# Patient Record
Sex: FEMALE | Race: ASIAN | NOT HISPANIC OR LATINO | URBAN - METROPOLITAN AREA
[De-identification: names, ages, dates, MRNs, and addresses within clinical notes are randomized per-mention and may not be internally consistent; named-entity substitution may affect disease eponyms.]

---

## 2022-12-21 NOTE — PROGRESS NOTES
"Female Preventive Health Visit    Social History     Social History Narrative    Moved from [facility] in 2018. Lives with cousin. Works at [employer] as . Enjoys shopping and watching [place] drama.      Entirely of appointment conducted with the assistance of a professional interpretor.     SUBJECTIVE:      This 27 year old female presents to establish care for a routine preventive physical exam. Reports no routine health care in some time.     The patient has the following concerns:     1.  Retained condom 1 month ago. Has had period since. Happy with condoms for now.     2.  S/p multiple skin graft surgeries to right lateral ankle occurring in [facility]. No outside records at the time of writing. From patient description sounds like prior large hemangioma in that area was surgically removed, requiring skin grafting.     Patient's medications, allergies, past medical, surgical and family histories were reviewed and updated as appropriate.    OB/Gyn History:      Last Pap Smear: none prior. Due first today.   Current Contraceptive Method:  condom. Is currently sexually active. Happy with this form of contraception.     Health Maintenance:  Health maintenance alerts were reviewed and updated as appropriate.    Healthy Habits:    Do you get at least three servings of calcium containing foods daily (dairy, green leafy vegetables, etc.)? yes    Amount of exercise or daily activities, outside of work: none noted    Problems taking medications regularly No    Medication side effects: No    Have you had an eye exam in the past two years? no    Do you see a dentist twice per year? no    Do you have sleep apnea, excessive snoring or daytime drowsiness?no      OBJECTIVE:  Vitals:    12/28/22 0913   BP: 92/68   Pulse: 62   Resp: 16   Temp: 97.5  F (36.4  C)   SpO2: 99%   Weight: 62.5 kg (137 lb 12.8 oz)   Height: 1.626 m (5' 4\")    Body mass index is 23.65 kg/m .    General: no acute distress, cooperative with " exam.  HEENT:  PERRLA. Bilateral TM's, external canals, oropharynx normal.    Neck:  Supple, no lymphadenopathy or thyromegaly   Breasts: breasts appear normal, no suspicious masses, no skin or nipple changes  Lungs: clear to auscultation bilaterally, normal respiratory effort.  Heart:  RRR without murmurs, rubs or gallops.  Normal S1 and S2.  Abdomen: normal bowel sounds, nontender, no palpable organomegaly.  Genitourinary: normal external genitalia, vulva, vagina. No palpable abnormalities or tenderness of the cervix, uterus and adnexa.   Skin:  Approximately 8 cm by 5 cm patch of well healed skin grafting area to right lateral shin, just superior to lateral malleolus. Overlying dry scale.   Extremities: warm, perfused, no swelling or edema.  Neuro:  CN II-XII intact, motor & sensory function all intact.    Psych: mental status normal, mood and affect appropriate.    ASSESSMENT / PLAN:  This 27 year old female presents for a routine preventive physical exam. Preventive health topics discussed including adequate exercise and healthy diet. Return to clinic in one year for preventative exam or sooner with any other concerns.  Other issues discussed as noted below.      Routine general medical examination at a health care facility    Encounter for screening for HIV  -     HIV 1/0/2 Rflx ([facility])    Need for hepatitis C screening test  -     VENOUS COLLECTION  -     HCV Antibody ([facility])    Need for HPV vaccination  Discussed and issued on AVS:  You received the HPV vaccine today: come back for the   -second dose in 1-2 months from today  -third dose in 6 months today   -     HPV, IM (9 - 26 YRS) - Gardasil 9    Cervical cancer screening  -     Pap IG HPV rfx HPV 16 and 18,45 ([facility])    Skin lesion  Status post split thickness skin graft  Encouraged to use daily moisture (Eucerin or CeraVe) on area. Healthy looking otherwise. Patient has further questions about care of this area and would like dermatology referral.    -     Adult Dermatology Referral; Future  -     Adult Dermatology Referral; Future    Encounter for surveillance of other contraceptive  Fully discussed high failure rate of condoms as sole contraception and discussed she is at risk of pregnancy. Voices understanding. Discussed alternative forms of contraception inclusive of combined OCP and LARCS. Declines for now. Will continue this conversation at future appointments.

## 2022-12-21 NOTE — PATIENT INSTRUCTIONS
Preventive Health Recommendations  Female Ages 21 to 25     Yearly exam:   See your health care provider every year in order to  Review health changes.   Discuss preventive care.    Review your medicines if your doctor has prescribed any.    You should be tested each year for STDs (sexually transmitted diseases).     Talk to your provider about how often you should have cholesterol testing.            Get a Pap test every three years. If you have an abnormal result, your doctor may have you test more often.    If you are at risk for diabetes, you should have a diabetes test (fasting glucose).     Shots:   Get a flu shot each year.   Get a tetanus shot every 10 years.   Consider getting the shot (vaccine) that prevents cervical cancer (Gardasil).    Nutrition:   Eat at least 5 servings of fruits and vegetables each day.  Eat whole-grain bread, whole-wheat pasta and brown rice instead of white grains and rice.  Get adequate Calcium and Vitamin D.     Lifestyle  Exercise at least 150 minutes a week each week (30 minutes a day, 5 days a week). This will help you control your weight and prevent disease.  Limit alcohol to one drink per day.  No smoking.   Wear sunscreen to prevent skin cancer.  See your dentist every six months for an exam and cleaning.    You received the HPV vaccine today: come back for the   -second dose in 1-2 months from today  -third dose in 6 months today       Over the counter lotions : best options    Eucerin or CeraVe ( heavy duty  lotions)   -CeraVe contains ceramides (natural fats in skin)   -good for everyday moisturizing    Cetaphil (less heavy)   -very low incidence of reactions   -use in sensitive skin

## 2022-12-28 ENCOUNTER — OFFICE VISIT (OUTPATIENT)
Dept: FAMILY MEDICINE | Facility: CLINIC | Age: 27
End: 2022-12-28

## 2022-12-28 VITALS
WEIGHT: 137.8 LBS | HEIGHT: 64 IN | SYSTOLIC BLOOD PRESSURE: 92 MMHG | HEART RATE: 62 BPM | RESPIRATION RATE: 16 BRPM | OXYGEN SATURATION: 99 % | BODY MASS INDEX: 23.52 KG/M2 | TEMPERATURE: 97.5 F | DIASTOLIC BLOOD PRESSURE: 68 MMHG

## 2022-12-28 DIAGNOSIS — Z30.49 ENCOUNTER FOR SURVEILLANCE OF OTHER CONTRACEPTIVE: ICD-10-CM

## 2022-12-28 DIAGNOSIS — Z94.5 STATUS POST SPLIT THICKNESS SKIN GRAFT: ICD-10-CM

## 2022-12-28 DIAGNOSIS — L98.9 SKIN LESION: ICD-10-CM

## 2022-12-28 DIAGNOSIS — Z23 NEED FOR HPV VACCINATION: ICD-10-CM

## 2022-12-28 DIAGNOSIS — Z11.59 NEED FOR HEPATITIS C SCREENING TEST: ICD-10-CM

## 2022-12-28 DIAGNOSIS — Z11.4 ENCOUNTER FOR SCREENING FOR HIV: ICD-10-CM

## 2022-12-28 DIAGNOSIS — Z12.4 CERVICAL CANCER SCREENING: ICD-10-CM

## 2022-12-28 DIAGNOSIS — Z23 NEED FOR IMMUNIZATION AGAINST TETANUS ALONE: ICD-10-CM

## 2022-12-28 DIAGNOSIS — Z00.00 ROUTINE GENERAL MEDICAL EXAMINATION AT A HEALTH CARE FACILITY: Primary | ICD-10-CM

## 2022-12-28 PROCEDURE — 99213 OFFICE O/P EST LOW 20 MIN: CPT | Mod: 25 | Performed by: FAMILY MEDICINE

## 2022-12-28 PROCEDURE — 90715 TDAP VACCINE 7 YRS/> IM: CPT | Performed by: FAMILY MEDICINE

## 2022-12-28 PROCEDURE — 90651 9VHPV VACCINE 2/3 DOSE IM: CPT | Performed by: FAMILY MEDICINE

## 2022-12-28 PROCEDURE — 90471 IMMUNIZATION ADMIN: CPT | Mod: 59 | Performed by: FAMILY MEDICINE

## 2022-12-28 PROCEDURE — 90472 IMMUNIZATION ADMIN EACH ADD: CPT | Mod: 59 | Performed by: FAMILY MEDICINE

## 2022-12-28 PROCEDURE — 99395 PREV VISIT EST AGE 18-39: CPT | Performed by: FAMILY MEDICINE

## 2022-12-28 PROCEDURE — 36415 COLL VENOUS BLD VENIPUNCTURE: CPT | Performed by: FAMILY MEDICINE

## 2022-12-28 NOTE — LETTER
[facility] [address] [place]  [address]  Phone: [phone]    January 5, 2023      Keyshawn Caldwell  [address] [place] [address]            Dear Keyshawn,     It was maria eugenia to see you at your recent appointment.     You PAP smear is negative for abnormal cells and POSITIVE for HPV (a virus associated with being sexually active.) You have no changes in your cells and no evidence of cervical cancer, but as the presents of this virus can increase the risk of cervical cancer in the future, we should recheck the PAP smear in 1 year.     Your next PAP smear will be in 1 year.       Please let us know if you have any questions.     Sincerely,     Dr. Viviane Pedro         Results for orders placed or performed in visit on 12/28/22   HCV Antibody ([facility])     Status: None   Result Value Ref Range    Hep C Virus Ab <0.1 0.0 - 0.9 s/co ratio    Narrative    Performed at:  01 - [facility] [address], [place], [place]  [id number]  : Joseph Trimble MD, Phone:  [phone]   HIV 1/0/2 Rflx ([facility])     Status: None   Result Value Ref Range    HIV Screen 4th Gen with Rflx Non Reactive Non Reactive    Narrative    Performed at:  01 - [facility] [address], [place], [place]  [id number]  : Joseph Trimble MD, Phone:  [phone]   Pap IG HPV rfx HPV 16 and 18,45 ([facility])     Status: Abnormal   Result Value Ref Range    DIAGNOSIS: Comment     Specimen adequacy: Comment     Source Cervix     Performed by: Comment     Electronically signed by: Comment     . .     Note: Comment     Test Methodology: Comment     HPV Aptima Positive (A) Negative    HPV GENOTYPE REFLEX Comment     Narrative    Performed at:  01 - [facility]  [address] [facility] [address] [facility], [place], [place]  [id number]  : Dave Lea MD, Phone:  [phone]    Specimen Comment: Source.............Cervix  Specimen Comment: LMP / Prev Treat...None   HPV 16 and 18, 45 ([facility])     Status: None    Result Value Ref Range    HPV Genotype 16 Negative Negative    HPV Genotype 18,45 Negative Negative    Narrative    Performed at:  01 - [facility]  [address] [address], [place], [place]  [id number]  : Dave Lea MD, Phone:  [phone]    Specimen Comment: Source.............Cervix  Specimen Comment: LMP / Prev Treat...None

## 2022-12-28 NOTE — LETTER
January 3, 2023      Keyshawn Caldwell  [address] [place] [address]        Dear ,    We are writing to inform you of your test results.    {results letter list:[id number]}    Resulted Orders   HCV Antibody ([facility])   Result Value Ref Range    Hep C Virus Ab <0.1 0.0 - 0.9 s/co ratio      Comment:                                        Negative:     < 0.8                               Indeterminate: 0.8 - 0.9                                    Positive:     > 0.9   HCV antibody alone does not differentiate between   previous resolved infection and active infection.   The CDC and current clinical guidelines recommend   that a positive HCV antibody result be followed up   with an HCV RNA test to support the diagnosis of   acute HCV infection. [facility] offers Hepatitis C   Virus (HCV) RNA, Diagnosis, DAHLIA (510296) and   Hepatitis C Virus (HCV) Antibody with reflex to   Quantitative Real-time PCR (682121).      Narrative    Performed at:  01 - [facility] [address], [place], [place]  [id number]  : Joseph Trimble MD, Phone:  [phone]   HIV 1/0/2 Rflx ([facility])   Result Value Ref Range    HIV Screen 4th Gen with Rflx Non Reactive Non Reactive      Comment:      HIV Negative  HIV-1/HIV-2 antibodies and HIV-1 p24 antigen were NOT detected.  There is no laboratory evidence of HIV infection.      Narrative    Performed at:  01 - [facility] [address], [place], [place]  [id number]  : Joseph Trimble MD, Phone:  [phone]       If you have any questions or concerns, please call the clinic at the number listed above.       Sincerely,      Viviane Pedro MD

## 2022-12-28 NOTE — LETTER
[facility] [address] [place]  [address]  Phone: [phone]    January 3, 2023      Keyshawn Caldwell  [address]  [place] [address]            Dear Keyshawn,     It was maria eugenia to see you at your recent appointment.     Here are your lab results.     Hep C testing is negative.     HIV screening negative.     Please let us know if you have any questions.     Sincerely,     Dr. Viviane Pedro       Results for orders placed or performed in visit on 12/28/22   HCV Antibody ([facility])     Status: None   Result Value Ref Range    Hep C Virus Ab <0.1 0.0 - 0.9 s/co ratio    Narrative    Performed at:  01 - [facility] [address], [place], [place]  [id number]  : Joseph Trimble MD, Phone:  [phone]   HIV 1/0/2 Rflx ([facility])     Status: None   Result Value Ref Range    HIV Screen 4th Gen with Rflx Non Reactive Non Reactive    Narrative    Performed at:  01 - [facility] [address], [place], [place]  [id number]  : Joseph Trimble MD, Phone:  [phone]

## 2022-12-29 LAB
HCV AB SERPL QL IA: <0.1 S/CO RATIO (ref 0–0.9)
HIV SCREEN 4TH GEN WITH RFLX: NON REACTIVE

## 2023-01-04 LAB
.: ABNORMAL
DIAGNOSIS:: ABNORMAL
HPV APTIMA: POSITIVE
HPV GENOTYPE 18,45: NEGATIVE
HPV GENOTYPE REFLEX: ABNORMAL
HPV16 DNA CVX QL NAA+PROBE: NEGATIVE
Lab: ABNORMAL
Lab: ABNORMAL
PERFORMED BY:: ABNORMAL
SOURCE: ABNORMAL
SPECIMEN ADEQUACY:: ABNORMAL
TEST METHODOLOGY:: ABNORMAL

## 2023-02-03 ENCOUNTER — OFFICE VISIT (OUTPATIENT)
Dept: FAMILY MEDICINE | Facility: CLINIC | Age: 28
End: 2023-02-03

## 2023-02-03 VITALS
OXYGEN SATURATION: 99 % | SYSTOLIC BLOOD PRESSURE: 98 MMHG | DIASTOLIC BLOOD PRESSURE: 72 MMHG | RESPIRATION RATE: 16 BRPM | BODY MASS INDEX: 23.34 KG/M2 | HEART RATE: 51 BPM | WEIGHT: 136 LBS

## 2023-02-03 DIAGNOSIS — Z97.3 WEARS GLASSES: ICD-10-CM

## 2023-02-03 DIAGNOSIS — Z23 NEED FOR HPV VACCINE: Primary | ICD-10-CM

## 2023-02-03 DIAGNOSIS — R87.810 CERVICAL HIGH RISK HPV (HUMAN PAPILLOMAVIRUS) TEST POSITIVE: ICD-10-CM

## 2023-02-03 PROCEDURE — 99213 OFFICE O/P EST LOW 20 MIN: CPT | Mod: 25 | Performed by: FAMILY MEDICINE

## 2023-02-03 PROCEDURE — 90651 9VHPV VACCINE 2/3 DOSE IM: CPT | Performed by: FAMILY MEDICINE

## 2023-02-03 PROCEDURE — 90471 IMMUNIZATION ADMIN: CPT | Mod: 59 | Performed by: FAMILY MEDICINE

## 2023-02-03 NOTE — PROGRESS NOTES
SUBJECTIVE:    Keyshawn Caldwell, is a 27 year old female presenting for the below:     Boyfriend interpreting.     1. Discuss positive high risk HPV result on PAP.     2. Request for opthalmology referral.     OBJECTIVE:  Vitals:    02/03/23 1041   BP: 98/72   Pulse: 51   Resp: 16   SpO2: 99%   Weight: 61.7 kg (136 lb)    Body mass index is 23.34 kg/m .  General: no acute distress, cooperative with exam.  Psych: mental status normal, mood and affect appropriate.    PAP : 1/5/2023    Cells negative  Positive for high risk HPV.    ASSESSMENT / PLAN:      Cervical high risk HPV (human papillomavirus) test positive  Discussed PAP smear in 1 year.     Need for HPV vaccine  Discussed continued benefit of completion of HPV vaccine series. #2 or 3 given today.   -     VACCINE ADMINISTRATION, INITIAL  -     HI HPV VAC 9V 3 DOSE IM      Wears glasses  -     Adult Eye  Referral; Future

## 2023-07-03 DIAGNOSIS — Z23 NEED FOR PROPHYLACTIC VACCINATION AGAINST HUMAN PAPILLOMAVIRUS: Primary | ICD-10-CM

## 2023-07-03 PROCEDURE — 90471 IMMUNIZATION ADMIN: CPT | Performed by: FAMILY MEDICINE

## 2023-07-03 PROCEDURE — 90651 9VHPV VACCINE 2/3 DOSE IM: CPT | Performed by: FAMILY MEDICINE

## 2024-09-16 NOTE — PATIENT INSTRUCTIONS
Patient Education   Preventive Care Advice   This is general advice given by our system to help you stay healthy. However, your care team may have specific advice just for you. Please talk to your care team about your preventive care needs.  Nutrition  Eat 5 or more servings of fruits and vegetables each day.  Try wheat bread, brown rice and whole grain pasta (instead of white bread, rice, and pasta).  Get enough calcium and vitamin D. Check the label on foods and aim for 100% of the RDA (recommended daily allowance).  Lifestyle  Exercise at least 150 minutes each week  (30 minutes a day, 5 days a week).  Do muscle strengthening activities 2 days a week. These help control your weight and prevent disease.  No smoking.  Wear sunscreen to prevent skin cancer.  Have a dental exam and cleaning every 6 months.  Yearly exams  See your health care team every year to talk about:  Any changes in your health.  Any medicines your care team has prescribed.  Preventive care, family planning, and ways to prevent chronic diseases.  Shots (vaccines)   HPV shots (up to age 26), if you've never had them before.  Hepatitis B shots (up to age 59), if you've never had them before.  COVID-19 shot: Get this shot when it's due.  Flu shot: Get a flu shot every year.  Tetanus shot: Get a tetanus shot every 10 years.  Pneumococcal, hepatitis A, and RSV shots: Ask your care team if you need these based on your risk.  Shingles shot (for age 50 and up)  General health tests  Diabetes screening:  Starting at age 35, Get screened for diabetes at least every 3 years.  If you are younger than age 35, ask your care team if you should be screened for diabetes.  Cholesterol test: At age 39, start having a cholesterol test every 5 years, or more often if advised.  Bone density scan (DEXA): At age 50, ask your care team if you should have this scan for osteoporosis (brittle bones).  Hepatitis C: Get tested at least once in your life.  STIs (sexually  transmitted infections)  Before age 24: Ask your care team if you should be screened for STIs.  After age 24: Get screened for STIs if you're at risk. You are at risk for STIs (including HIV) if:  You are sexually active with more than one person.  You don't use condoms every time.  You or a partner was diagnosed with a sexually transmitted infection.  If you are at risk for HIV, ask about PrEP medicine to prevent HIV.  Get tested for HIV at least once in your life, whether you are at risk for HIV or not.  Cancer screening tests  Cervical cancer screening: If you have a cervix, begin getting regular cervical cancer screening tests starting at age 21.  Breast cancer scan (mammogram): If you've ever had breasts, begin having regular mammograms starting at age 40. This is a scan to check for breast cancer.  Colon cancer screening: It is important to start screening for colon cancer at age 45.  Have a colonoscopy test every 10 years (or more often if you're at risk) Or, ask your provider about stool tests like a FIT test every year or Cologuard test every 3 years.  To learn more about your testing options, visit:   .  For help making a decision, visit:   https://bit.ly/ak83565.  Prostate cancer screening test: If you have a prostate, ask your care team if a prostate cancer screening test (PSA) at age 55 is right for you.  Lung cancer screening: If you are a current or former smoker ages 50 to 80, ask your care team if ongoing lung cancer screenings are right for you.  For informational purposes only. Not to replace the advice of your health care provider. Copyright   2023 [employer]. All rights reserved. Clinically reviewed by the [facility] Transitions Program. [employer] [id number] - REV 01/24.

## 2024-09-17 ENCOUNTER — OFFICE VISIT (OUTPATIENT)
Dept: FAMILY MEDICINE | Facility: CLINIC | Age: 29
End: 2024-09-17

## 2024-09-17 VITALS
HEART RATE: 73 BPM | BODY MASS INDEX: 24.46 KG/M2 | DIASTOLIC BLOOD PRESSURE: 59 MMHG | SYSTOLIC BLOOD PRESSURE: 114 MMHG | WEIGHT: 146.8 LBS | OXYGEN SATURATION: 100 % | HEIGHT: 65 IN

## 2024-09-17 DIAGNOSIS — Z12.4 CERVICAL CANCER SCREENING: ICD-10-CM

## 2024-09-17 DIAGNOSIS — Z00.00 ROUTINE GENERAL MEDICAL EXAMINATION AT A HEALTH CARE FACILITY: Primary | ICD-10-CM

## 2024-09-17 LAB
ANION GAP SERPL CALCULATED.3IONS-SCNC: 7 MMOL/L (ref 7–15)
BUN SERPL-MCNC: 10.1 MG/DL (ref 6–20)
CALCIUM SERPL-MCNC: 9 MG/DL (ref 8.8–10.4)
CHLORIDE SERPL-SCNC: 105 MMOL/L (ref 98–107)
CHOLESTEROL: 157 MG/DL (ref 100–199)
CREAT SERPL-MCNC: 0.61 MG/DL (ref 0.51–0.95)
EGFRCR SERPLBLD CKD-EPI 2021: >90 ML/MIN/1.73M2
FASTING STATUS PATIENT QL REPORTED: NO
FASTING?: NO
GLUCOSE SERPL-MCNC: 87 MG/DL (ref 70–99)
HCO3 SERPL-SCNC: 26 MMOL/L (ref 22–29)
HDL (RMG): 58 MG/DL (ref 40–?)
LDL CALCULATED (RMG): 78 MG/DL (ref 0–130)
POTASSIUM SERPL-SCNC: 3.8 MMOL/L (ref 3.4–5.3)
SODIUM SERPL-SCNC: 138 MMOL/L (ref 135–145)
TRIGLYCERIDES (RMG): 107 MG/DL (ref 0–149)

## 2024-09-17 PROCEDURE — 36415 COLL VENOUS BLD VENIPUNCTURE: CPT | Performed by: FAMILY MEDICINE

## 2024-09-17 PROCEDURE — 80061 LIPID PANEL: CPT | Mod: QW | Performed by: FAMILY MEDICINE

## 2024-09-17 PROCEDURE — G0145 SCR C/V CYTO,THINLAYER,RESCR: HCPCS | Performed by: FAMILY MEDICINE

## 2024-09-17 PROCEDURE — 80048 BASIC METABOLIC PNL TOTAL CA: CPT | Performed by: FAMILY MEDICINE

## 2024-09-17 PROCEDURE — 88175 CYTOPATH C/V AUTO FLUID REDO: CPT | Mod: 90 | Performed by: FAMILY MEDICINE

## 2024-09-17 PROCEDURE — 87624 HPV HI-RISK TYP POOLED RSLT: CPT | Mod: 90 | Performed by: FAMILY MEDICINE

## 2024-09-17 PROCEDURE — 80048 BASIC METABOLIC PNL TOTAL CA: CPT | Mod: 90 | Performed by: FAMILY MEDICINE

## 2024-09-17 PROCEDURE — 99395 PREV VISIT EST AGE 18-39: CPT | Performed by: FAMILY MEDICINE

## 2024-09-17 PROCEDURE — 87624 HPV HI-RISK TYP POOLED RSLT: CPT | Performed by: FAMILY MEDICINE

## 2024-09-17 NOTE — PROGRESS NOTES
Female Preventive Health Visit    SUBJECTIVE:    This 28 year old female presents for a routine preventive physical exam.    The patient has the following concerns:     -none    Patient's medications, allergies, past medical, surgical and family histories were reviewed and updated as appropriate.    OB/Gyn History:    Last Pap Smear: overdue 1 year follow up PAP for positive low risk HPV.   Current Contraceptive Method:  condoms. Is currently sexually active. Happy with this form of contraception.           9/17/2024   General Health   How would you rate your overall physical health? (!) FAIR   Feel stress (tense, anxious, or unable to sleep) Only a little      (!) STRESS CONCERN      9/17/2024   Nutrition   Three or more servings of calcium each day? Yes   Diet: Regular (no restrictions)   How many servings of fruit and vegetables per day? (!) 2-3   How many sweetened beverages each day? 0-1            9/17/2024   Exercise   Days per week of moderate/strenous exercise 2 days   Average minutes spent exercising at this level 30 min      (!) EXERCISE CONCERN      9/17/2024   Social Factors   Frequency of gathering with friends or relatives Once a week   Worry food won't last until get money to buy more No   Food not last or not have enough money for food? No   Do you have housing? (Housing is defined as stable permanent housing and does not include staying ouside in a car, in a tent, in an abandoned building, in an overnight shelter, or couch-surfing.) Yes   Are you worried about losing your housing? No   Lack of transportation? No   Unable to get utilities (heat,electricity)? No            9/17/2024   Dental   Dentist two times every year? (!) NO            9/17/2024   TB Screening   Were you born outside of the [place]? Yes        Today's PHQ-2 Score:       9/17/2024     2:52 PM   PHQ-2 ( 1999 [employer])   Q1: Little interest or pleasure in doing things 0   Q2: Feeling down, depressed or hopeless 0   PHQ-2 Score 0          "9/17/2024   Substance Use   Alcohol more than 3/day or more than 7/wk No   Do you use any other substances recreationally? No        Social History     Tobacco Use    Smoking status: Never    Smokeless tobacco: Never   Substance Use Topics    Alcohol use: Yes    Drug use: Never           9/17/2024   STI Screening   New sexual partner(s) since last STI/HIV test? No            9/17/2024   Contraception/Family Planning   Questions about contraception or family planning No        OBJECTIVE:  Vitals:    09/17/24 1449   BP: 114/59   Pulse: 73   SpO2: 100%   Weight: 66.6 kg (146 lb 12.8 oz)   Height: 1.638 m (5' 4.5\")    Body mass index is 24.81 kg/m .  General: no acute distress, cooperative with exam.  HEENT:  PERRLA. Bilateral TM's, external canals, oropharynx normal.    Neck:  Supple, no lymphadenopathy or thyromegaly   Lungs: clear to auscultation bilaterally, normal respiratory effort.  Heart:  RRR without murmurs, rubs or gallops.  Normal S1 and S2.  Abdomen: normal bowel sounds, nontender, no palpable organomegaly.  Skin:  No lesions.  No rashes.  Extremities: warm, perfused, no swelling or edema.  Neuro:  CN II-XII intact, motor & sensory function all intact.    Psych: mental status normal, mood and affect appropriate.      ASSESSMENT / PLAN:  This 28 year old female presents for a routine preventive physical exam. Preventive health topics discussed including adequate exercise and healthy diet. Return to clinic in one year for preventative exam or sooner with any other concerns.  Other issues discussed as noted below.      Routine general medical examination at a health care facility  -     Basic metabolic panel; Future  -     Lipid Profile ([facility])    Cervical cancer screening  Overdue 1 year follow up PAP for positive low risk HPV.   -     Gynecologic Cytology (PAP); Future      "

## 2024-09-17 NOTE — LETTER
September 24, 2024      Keyshawn Caldwell  [address] [place] [address]              Dear Keyshawn,     It was maria eugenia to see you at your recent appointment.     Here are your lab results.     Your PAP smear is negative for abnormal cells and HPV. Good news.      As you had previous positive HPV, your next PAP smear will be in 1 year.   Resulted Orders   Lipid Profile ([facility])   Result Value Ref Range    Cholesterol 157 100 - 199 mg/dL    HDL 58 40 mg/dL    Triglycerides 107 0 - 149 mg/dL    LDL CALCULATED ([facility]) 78 0 - 130 mg/dL    Patient Fasting? No    Gynecologic Cytology (PAP)   Result Value Ref Range    Interpretation        Negative for Intraepithelial Lesion or Malignancy (NILM)    Comment         Papanicolaou Test Limitations:  Cervical cytology is a screening test with limited sensitivity, and regular screening is critical for cancer prevention.  Pap tests are primarily effective for the diagnosis/prevention of squamous cell carcinoma, not adenocarcinoma or other cancers.        Specimen Adequacy       Satisfactory for evaluation, endocervical/transformation zone component present    Clinical Information       none      Reflex Testing Yes if ASCUS     Previous Abnormal?       No      Performing Labs       The technical component of this testing was completed at [facility].    Stain controls for all stains resulted within this report have been reviewed and show appropriate reactivity.     Basic metabolic panel   Result Value Ref Range    Sodium 138 135 - 145 mmol/L    Potassium 3.8 3.4 - 5.3 mmol/L    Chloride 105 98 - 107 mmol/L    Carbon Dioxide (CO2) 26 22 - 29 mmol/L    Anion Gap 7 7 - 15 mmol/L    Urea Nitrogen 10.1 6.0 - 20.0 mg/dL    Creatinine 0.61 0.51 - 0.95 mg/dL    GFR Estimate >90 >60 mL/min/1.73m2      Comment:      eGFR calculated using 2021 CKD-EPI equation.    Calcium 9.0 8.8 - 10.4 mg/dL      Comment:      Reference intervals for this  test were updated on 7/16/2024 to reflect our healthy population more accurately. There may be differences in the flagging of prior results with similar values performed with this method. Those prior results can be interpreted in the context of the updated reference intervals.    Glucose 87 70 - 99 mg/dL    Patient Fasting > 8hrs? No    HPV High Risk Types DNA Cervical   Result Value Ref Range    Human Papilloma Virus 16 DNA Negative Negative    Human Papilloma Virus 18 DNA Negative Negative    Human Papilloma Virus Other Negative Negative    FINAL DIAGNOSIS       This patient's sample is negative for high risk HPV DNA.          METHODOLOGY: The [employer] system uses automated extraction, simultaneous amplification of HPV (E6/E7 oncogenes) and beta-globin, followed by real time detection of fluorescent labeled HPV and beta globin using specific oligonucleotide probes. The test specifically identifies types HPV 16 DNA and HPV 18 DNA while concurrently detecting the rest of the high risk types (31, 33, 35, 39, 45, 51, 52, 56, 58, 59, 66 or 68).     COMMENTS: This test is not intended for use as a screening device for woman under age 30 with normal cervical cytology. Results should be correlated with cytologic and histologic findings. Close clinical follow up is recommended.           If you have any questions or concerns, please call the clinic at the number listed above.       Sincerely,      Viviane Pedro MD

## 2024-09-17 NOTE — LETTER
September 18, 2024      Keyshawn Caldwell  [address] [place] [address]              Dear Keyshawn,     It was maria eugenia to see you at your recent appointment.     Here are your lab results.     Your kidney function and electrolytes (salts in the blood) are all within normal limits.     Your cholesterol values look wonderful. Everything is within normal limits.     I will send a further letter once the PAP smear results are back.     Resulted Orders   Lipid Profile ([facility])   Result Value Ref Range    Cholesterol 157 100 - 199 mg/dL    HDL 58 40 mg/dL    Triglycerides 107 0 - 149 mg/dL    LDL CALCULATED ([facility]) 78 0 - 130 mg/dL    Patient Fasting? No    Basic metabolic panel   Result Value Ref Range    Sodium 138 135 - 145 mmol/L    Potassium 3.8 3.4 - 5.3 mmol/L    Chloride 105 98 - 107 mmol/L    Carbon Dioxide (CO2) 26 22 - 29 mmol/L    Anion Gap 7 7 - 15 mmol/L    Urea Nitrogen 10.1 6.0 - 20.0 mg/dL    Creatinine 0.61 0.51 - 0.95 mg/dL    GFR Estimate >90 >60 mL/min/1.73m2      Comment:      eGFR calculated using 2021 CKD-EPI equation.    Calcium 9.0 8.8 - 10.4 mg/dL      Comment:      Reference intervals for this test were updated on 7/16/2024 to reflect our healthy population more accurately. There may be differences in the flagging of prior results with similar values performed with this method. Those prior results can be interpreted in the context of the updated reference intervals.    Glucose 87 70 - 99 mg/dL    Patient Fasting > 8hrs? No        If you have any questions or concerns, please call the clinic at the number listed above.       Sincerely,      Viviane Pedro MD

## 2024-09-20 LAB
BKR LAB AP GYN ADEQUACY: NORMAL
BKR LAB AP GYN INTERPRETATION: NORMAL
BKR LAB AP HPV REFLEX: NORMAL
BKR LAB AP PREVIOUS ABNORMAL: NORMAL
PATH REPORT.COMMENTS IMP SPEC: NORMAL
PATH REPORT.COMMENTS IMP SPEC: NORMAL
PATH REPORT.RELEVANT HX SPEC: NORMAL

## 2024-09-23 LAB
HPV HR 12 DNA CVX QL NAA+PROBE: NEGATIVE
HPV16 DNA CVX QL NAA+PROBE: NEGATIVE
HPV18 DNA CVX QL NAA+PROBE: NEGATIVE
HUMAN PAPILLOMA VIRUS FINAL DIAGNOSIS: NORMAL